# Patient Record
Sex: FEMALE | Race: WHITE | NOT HISPANIC OR LATINO | URBAN - METROPOLITAN AREA
[De-identification: names, ages, dates, MRNs, and addresses within clinical notes are randomized per-mention and may not be internally consistent; named-entity substitution may affect disease eponyms.]

---

## 2017-01-04 ENCOUNTER — AMBULATORY - HEALTHEAST (OUTPATIENT)
Dept: NEUROSURGERY | Facility: CLINIC | Age: 37
End: 2017-01-04

## 2017-01-04 ENCOUNTER — OFFICE VISIT - HEALTHEAST (OUTPATIENT)
Dept: NEUROSURGERY | Facility: CLINIC | Age: 37
End: 2017-01-04

## 2017-01-04 DIAGNOSIS — M45.9 AS (ANKYLOSING SPONDYLITIS) (H): ICD-10-CM

## 2017-01-04 DIAGNOSIS — M54.2 NECK PAIN OF OVER 3 MONTHS DURATION: ICD-10-CM

## 2017-01-04 DIAGNOSIS — M54.12 BRACHIAL NEURITIS OR RADICULITIS: ICD-10-CM

## 2017-01-04 ASSESSMENT — MIFFLIN-ST. JEOR: SCORE: 1375.09

## 2017-01-31 ENCOUNTER — COMMUNICATION - HEALTHEAST (OUTPATIENT)
Dept: FAMILY MEDICINE | Facility: CLINIC | Age: 37
End: 2017-01-31

## 2017-01-31 DIAGNOSIS — F90.0 ATTENTION DEFICIT HYPERACTIVITY DISORDER (ADHD), PREDOMINANTLY INATTENTIVE TYPE: ICD-10-CM

## 2017-03-06 ENCOUNTER — COMMUNICATION - HEALTHEAST (OUTPATIENT)
Dept: FAMILY MEDICINE | Facility: CLINIC | Age: 37
End: 2017-03-06

## 2017-03-06 DIAGNOSIS — F90.0 ATTENTION DEFICIT HYPERACTIVITY DISORDER (ADHD), PREDOMINANTLY INATTENTIVE TYPE: ICD-10-CM

## 2017-04-06 ENCOUNTER — COMMUNICATION - HEALTHEAST (OUTPATIENT)
Dept: FAMILY MEDICINE | Facility: CLINIC | Age: 37
End: 2017-04-06

## 2017-04-06 DIAGNOSIS — F90.0 ATTENTION DEFICIT HYPERACTIVITY DISORDER (ADHD), PREDOMINANTLY INATTENTIVE TYPE: ICD-10-CM

## 2017-05-09 ENCOUNTER — COMMUNICATION - HEALTHEAST (OUTPATIENT)
Dept: FAMILY MEDICINE | Facility: CLINIC | Age: 37
End: 2017-05-09

## 2017-05-09 DIAGNOSIS — F90.0 ATTENTION DEFICIT HYPERACTIVITY DISORDER (ADHD), PREDOMINANTLY INATTENTIVE TYPE: ICD-10-CM

## 2017-06-07 ENCOUNTER — COMMUNICATION - HEALTHEAST (OUTPATIENT)
Dept: FAMILY MEDICINE | Facility: CLINIC | Age: 37
End: 2017-06-07

## 2017-06-07 DIAGNOSIS — F90.0 ATTENTION DEFICIT HYPERACTIVITY DISORDER (ADHD), PREDOMINANTLY INATTENTIVE TYPE: ICD-10-CM

## 2017-07-14 ENCOUNTER — COMMUNICATION - HEALTHEAST (OUTPATIENT)
Dept: FAMILY MEDICINE | Facility: CLINIC | Age: 37
End: 2017-07-14

## 2017-07-14 DIAGNOSIS — F90.0 ATTENTION DEFICIT HYPERACTIVITY DISORDER (ADHD), PREDOMINANTLY INATTENTIVE TYPE: ICD-10-CM

## 2017-08-17 ENCOUNTER — COMMUNICATION - HEALTHEAST (OUTPATIENT)
Dept: FAMILY MEDICINE | Facility: CLINIC | Age: 37
End: 2017-08-17

## 2017-08-17 DIAGNOSIS — F90.0 ATTENTION DEFICIT HYPERACTIVITY DISORDER (ADHD), PREDOMINANTLY INATTENTIVE TYPE: ICD-10-CM

## 2017-08-21 ENCOUNTER — AMBULATORY - HEALTHEAST (OUTPATIENT)
Dept: FAMILY MEDICINE | Facility: CLINIC | Age: 37
End: 2017-08-21

## 2017-08-21 DIAGNOSIS — F90.0 ATTENTION DEFICIT HYPERACTIVITY DISORDER (ADHD), PREDOMINANTLY INATTENTIVE TYPE: ICD-10-CM

## 2017-08-28 ENCOUNTER — OFFICE VISIT - HEALTHEAST (OUTPATIENT)
Dept: INTERNAL MEDICINE | Facility: CLINIC | Age: 37
End: 2017-08-28

## 2017-08-28 DIAGNOSIS — N93.9 ABNORMAL UTERINE BLEEDING (AUB): ICD-10-CM

## 2017-08-28 DIAGNOSIS — R51.9 FACIAL PAIN, ACUTE: ICD-10-CM

## 2017-08-29 ENCOUNTER — AMBULATORY - HEALTHEAST (OUTPATIENT)
Dept: LAB | Facility: CLINIC | Age: 37
End: 2017-08-29

## 2017-08-29 DIAGNOSIS — N93.9 ABNORMAL UTERINE BLEEDING (AUB): ICD-10-CM

## 2017-09-06 ENCOUNTER — COMMUNICATION - HEALTHEAST (OUTPATIENT)
Dept: FAMILY MEDICINE | Facility: CLINIC | Age: 37
End: 2017-09-06

## 2017-09-06 LAB — SPECIMEN STATUS: NORMAL

## 2017-09-22 ENCOUNTER — COMMUNICATION - HEALTHEAST (OUTPATIENT)
Dept: FAMILY MEDICINE | Facility: CLINIC | Age: 37
End: 2017-09-22

## 2017-09-22 DIAGNOSIS — F90.0 ATTENTION DEFICIT HYPERACTIVITY DISORDER (ADHD), PREDOMINANTLY INATTENTIVE TYPE: ICD-10-CM

## 2017-10-27 ENCOUNTER — OFFICE VISIT - HEALTHEAST (OUTPATIENT)
Dept: FAMILY MEDICINE | Facility: CLINIC | Age: 37
End: 2017-10-27

## 2017-10-27 DIAGNOSIS — K13.79 PAIN IN MOUTH: ICD-10-CM

## 2017-11-01 ENCOUNTER — COMMUNICATION - HEALTHEAST (OUTPATIENT)
Dept: FAMILY MEDICINE | Facility: CLINIC | Age: 37
End: 2017-11-01

## 2017-11-01 DIAGNOSIS — F90.0 ATTENTION DEFICIT HYPERACTIVITY DISORDER (ADHD), PREDOMINANTLY INATTENTIVE TYPE: ICD-10-CM

## 2017-12-14 ENCOUNTER — COMMUNICATION - HEALTHEAST (OUTPATIENT)
Dept: FAMILY MEDICINE | Facility: CLINIC | Age: 37
End: 2017-12-14

## 2017-12-14 DIAGNOSIS — F90.0 ATTENTION DEFICIT HYPERACTIVITY DISORDER (ADHD), PREDOMINANTLY INATTENTIVE TYPE: ICD-10-CM

## 2018-01-29 ENCOUNTER — COMMUNICATION - HEALTHEAST (OUTPATIENT)
Dept: FAMILY MEDICINE | Facility: CLINIC | Age: 38
End: 2018-01-29

## 2018-01-29 DIAGNOSIS — F90.0 ATTENTION DEFICIT HYPERACTIVITY DISORDER (ADHD), PREDOMINANTLY INATTENTIVE TYPE: ICD-10-CM

## 2018-02-07 ENCOUNTER — OFFICE VISIT - HEALTHEAST (OUTPATIENT)
Dept: FAMILY MEDICINE | Facility: CLINIC | Age: 38
End: 2018-02-07

## 2018-02-07 DIAGNOSIS — L91.8 SKIN TAG: ICD-10-CM

## 2018-02-07 DIAGNOSIS — F90.0 ATTENTION DEFICIT HYPERACTIVITY DISORDER (ADHD), PREDOMINANTLY INATTENTIVE TYPE: ICD-10-CM

## 2018-02-07 DIAGNOSIS — N93.9 ABNORMAL UTERINE BLEEDING: ICD-10-CM

## 2018-02-07 LAB
ESTRADIOL SERPL-MCNC: 40 PG/ML
FSH SERPL-ACNC: 13.1 MIU/ML
LH SERPL-ACNC: 4.6 MIU/ML
PROLACTIN SERPL-MCNC: 19.4 NG/ML (ref 0–20)

## 2018-02-07 ASSESSMENT — MIFFLIN-ST. JEOR: SCORE: 1388.69

## 2018-02-08 ENCOUNTER — COMMUNICATION - HEALTHEAST (OUTPATIENT)
Dept: FAMILY MEDICINE | Facility: CLINIC | Age: 38
End: 2018-02-08

## 2018-02-21 ENCOUNTER — COMMUNICATION - HEALTHEAST (OUTPATIENT)
Dept: FAMILY MEDICINE | Facility: CLINIC | Age: 38
End: 2018-02-21

## 2018-03-23 ENCOUNTER — COMMUNICATION - HEALTHEAST (OUTPATIENT)
Dept: FAMILY MEDICINE | Facility: CLINIC | Age: 38
End: 2018-03-23

## 2018-03-23 DIAGNOSIS — F90.0 ATTENTION DEFICIT HYPERACTIVITY DISORDER (ADHD), PREDOMINANTLY INATTENTIVE TYPE: ICD-10-CM

## 2018-04-27 ENCOUNTER — COMMUNICATION - HEALTHEAST (OUTPATIENT)
Dept: FAMILY MEDICINE | Facility: CLINIC | Age: 38
End: 2018-04-27

## 2018-04-27 DIAGNOSIS — F90.0 ATTENTION DEFICIT HYPERACTIVITY DISORDER (ADHD), PREDOMINANTLY INATTENTIVE TYPE: ICD-10-CM

## 2018-06-14 ENCOUNTER — COMMUNICATION - HEALTHEAST (OUTPATIENT)
Dept: FAMILY MEDICINE | Facility: CLINIC | Age: 38
End: 2018-06-14

## 2018-06-14 DIAGNOSIS — F90.0 ATTENTION DEFICIT HYPERACTIVITY DISORDER (ADHD), PREDOMINANTLY INATTENTIVE TYPE: ICD-10-CM

## 2018-07-23 ENCOUNTER — COMMUNICATION - HEALTHEAST (OUTPATIENT)
Dept: FAMILY MEDICINE | Facility: CLINIC | Age: 38
End: 2018-07-23

## 2018-07-23 DIAGNOSIS — F90.0 ATTENTION DEFICIT HYPERACTIVITY DISORDER (ADHD), PREDOMINANTLY INATTENTIVE TYPE: ICD-10-CM

## 2018-09-06 ENCOUNTER — COMMUNICATION - HEALTHEAST (OUTPATIENT)
Dept: FAMILY MEDICINE | Facility: CLINIC | Age: 38
End: 2018-09-06

## 2018-09-06 DIAGNOSIS — F90.0 ATTENTION DEFICIT HYPERACTIVITY DISORDER (ADHD), PREDOMINANTLY INATTENTIVE TYPE: ICD-10-CM

## 2018-10-11 ENCOUNTER — COMMUNICATION - HEALTHEAST (OUTPATIENT)
Dept: FAMILY MEDICINE | Facility: CLINIC | Age: 38
End: 2018-10-11

## 2018-10-11 DIAGNOSIS — F90.0 ATTENTION DEFICIT HYPERACTIVITY DISORDER (ADHD), PREDOMINANTLY INATTENTIVE TYPE: ICD-10-CM

## 2018-12-02 ENCOUNTER — COMMUNICATION - HEALTHEAST (OUTPATIENT)
Dept: FAMILY MEDICINE | Facility: CLINIC | Age: 38
End: 2018-12-02

## 2018-12-02 DIAGNOSIS — F90.0 ATTENTION DEFICIT HYPERACTIVITY DISORDER (ADHD), PREDOMINANTLY INATTENTIVE TYPE: ICD-10-CM

## 2019-01-24 ENCOUNTER — COMMUNICATION - HEALTHEAST (OUTPATIENT)
Dept: FAMILY MEDICINE | Facility: CLINIC | Age: 39
End: 2019-01-24

## 2019-01-24 DIAGNOSIS — F90.0 ATTENTION DEFICIT HYPERACTIVITY DISORDER (ADHD), PREDOMINANTLY INATTENTIVE TYPE: ICD-10-CM

## 2019-04-02 ENCOUNTER — COMMUNICATION - HEALTHEAST (OUTPATIENT)
Dept: FAMILY MEDICINE | Facility: CLINIC | Age: 39
End: 2019-04-02

## 2019-04-02 DIAGNOSIS — F90.0 ATTENTION DEFICIT HYPERACTIVITY DISORDER (ADHD), PREDOMINANTLY INATTENTIVE TYPE: ICD-10-CM

## 2019-05-29 ENCOUNTER — COMMUNICATION - HEALTHEAST (OUTPATIENT)
Dept: FAMILY MEDICINE | Facility: CLINIC | Age: 39
End: 2019-05-29

## 2019-05-29 DIAGNOSIS — M54.12 BRACHIAL NEURITIS OR RADICULITIS: ICD-10-CM

## 2019-05-29 DIAGNOSIS — F90.0 ATTENTION DEFICIT HYPERACTIVITY DISORDER (ADHD), PREDOMINANTLY INATTENTIVE TYPE: ICD-10-CM

## 2019-05-29 RX ORDER — DEXTROAMPHETAMINE SACCHARATE, AMPHETAMINE ASPARTATE MONOHYDRATE, DEXTROAMPHETAMINE SULFATE AND AMPHETAMINE SULFATE 5; 5; 5; 5 MG/1; MG/1; MG/1; MG/1
20 CAPSULE, EXTENDED RELEASE ORAL EVERY MORNING
Qty: 30 CAPSULE | Refills: 0 | Status: SHIPPED | OUTPATIENT
Start: 2019-05-29

## 2021-05-27 NOTE — TELEPHONE ENCOUNTER
Controlled Substance Refill Request  Medication Name:   Requested Prescriptions     Pending Prescriptions Disp Refills     dextroamphetamine-amphetamine (ADDERALL XR) 20 MG 24 hr capsule 30 capsule 0     Sig: Take 1 capsule (20 mg total) by mouth every morning.     Date Last Fill: 1/24/2019Pharmacy: [facility]      Submit electronically to pharmacy  Controlled Substance Agreement Date Scanned:   Encounter-Level CSA Scan Date - 02/14/2018:    Scan on 2/14/2018: HE - (below)         Last office visit with prescriber/PCP: 2/7/2018 Hever Keller MD OR same dept: Visit date not found OR same specialty: 2/7/2018 Hever Keller MD  Last physical: Visit date not found Last MTM visit: Visit date not found

## 2021-05-29 NOTE — TELEPHONE ENCOUNTER
Who is calling:  The patient   Reason for Call:  The patient is calling to check the status of the medication request. The patient is aware that the script is pending review.   Date of last appointment with primary care: 2/7/2018  Okay to leave a detailed message: No

## 2021-05-30 VITALS — BODY MASS INDEX: 23.54 KG/M2 | WEIGHT: 150 LBS | HEIGHT: 67 IN

## 2021-05-31 VITALS — BODY MASS INDEX: 23.85 KG/M2 | WEIGHT: 150 LBS

## 2021-05-31 VITALS — WEIGHT: 157.8 LBS | BODY MASS INDEX: 25.09 KG/M2

## 2021-06-01 VITALS — WEIGHT: 153 LBS | HEIGHT: 67 IN | BODY MASS INDEX: 24.01 KG/M2

## 2021-06-03 ENCOUNTER — RECORDS - HEALTHEAST (OUTPATIENT)
Dept: ADMINISTRATIVE | Facility: CLINIC | Age: 41
End: 2021-06-03

## 2021-06-08 NOTE — PROGRESS NOTES
Assessment/Plan:        Diagnoses and all orders for this visit:    Cervical Radiculopathy  -     OPS TFESI C/T Spine Unilateral; Future; Expected date: 1/4/17    AS (ankylosing spondylitis)    Neck pain of over 3 months duration  -     Ambulatory referral to Physical Therapy  -     Ambulatory referral for Acupuncture            It was a pleasure to evaluate Mis Salgado at the kind referral of Liya Joseph PA-C for neck and left arm pain.    Prior to making any surgical recommendations, this patient needs to obtain the x-rays that I had ordered prior to her visit which she did not obtain yet.  She is to return following these x-rays for discussion of the results.    I talked to her that foraminotomy is the most likely surgery that I would recommend due to her isolated C4-C5 left foraminal stenosis if left upper arm pain symptoms are refractory to injections.  She states that her left arm numbness resolved after her injection in December 2016.  She further stated that she needed to injections 2 years ago in order to see benefit for her arm pain.  I told her I would be happy to order another left C4-C5 injection for her if her symptoms came back if she wanted to try that again.  I told her that if her left arm symptoms were the worst thing, then she would likely be a candidate for surgery.  I did carefully differentiate between the baseline neck pain that she has and her left C5 radiculopathy.  I told her that her left finger tingling has nothing to do with left C5 and we do not have an EMG diagnosis for that and therefore I would not expect it to improve with any surgery.  She states her left finger tingling is minor and is brought on by things like typing at work and driving and otherwise is not distressing to her.  She is going to think more about whether her left upper arm pain is significantly bothering her.  If it is, I recommend that she obtain the x-rays that I had ordered an return for surgical  "discussion.  I emphasized on several different occasions that her neck pain due to her spondyloarthropathy would likely not be improved by any foraminotomy    I think she would benefit from ongoing conservative treatment and I ordered cervical spine physical therapy for her.  Acupuncture would likely be highly useful for her for the musculoskeletal component of her pain and I have ordered this as well.    This patient's ankylosing spondylitis treatment with Humira would complicate her surgical course. She has significant SI joint and neck pain that are due to AS and not going to be improved with surgery. There is ample literature based evidence the patient's on a TNF inhibitor such as Humira that this patient is on, exhibit a 2.5 fold increased incidence (or higher) of surgical complications with wound healing and infection.  Recommendations are to hold this medication for greater than one administration interval prior to and following surgery to assist in healing.  However, the patient remains in an elevated risk of infection and non- healing.      I performed independent visualization of radiographic imaging and entered my own interpretation, \"reviewed and/or ordered clinical laboratory tests, reviewed and/or ordered tests in radiology, made the decision to obtain old records and/or history from someone other than the patient and Reviewed and summarized old records and/or discussed this case with another health care provider\".      I spent 60 minutes in patient care with greater than 50% spent in counseling and/or coordination of care.      Subjective:    Patient ID: Mis Salgado is a 36 y.o. female.    HPI    Mis Salgado is a 36 y.o. female who presents with left-sided neck pain with radiation into the left upper extremity in shoulder and upper arm, not extending distal to elbow. Exacerbated by driving, typing, and laying down to sleep. Relieved with lifting her left arm above her head. Symptoms " started several years ago when a mattress fell on her.  She had relief of arm pain at that time with 2 MARIANELA    I obtained and reviewed EMG by Dr. Vu done 12/16 which did not show any abnormalities; no carpal tunnel or radiculopathy.    I have reviewed Liya Jake's prior notes from 12/16- patient is status post a left C4-5 transforaminal epidural steroid injection on December 5, 2016; patient stated this provided 70% relief of her daytime pain, and her left arm numbness from shoulder to elbow is gone. The patient did physical therapy for her neck in 2014. She is using cyclobenzaprine at 3-4 times per week. She is also using ibuprofen as needed for pain.    She also has numbness and tingling in the left thumb and 1st finger, this has been present and not affected by C4-5 TFESI, it is exacerbated by having ponytail holders on her wrist, and was relieved somewhat with taking these off.        She has ankylosing spondylitis and is on Humira injections. She sees Dr. Madrid of Rheumatology but her insurance recently changed and she said she needs to find a new Rheumatologist.    She has been treated for SI joint pain from her AS.        IMAGING per my interpretation:  MRI cervical spine 11/30/16: left C4-5 foraminal stenosis, no significant central stenosis, no foraminal stenosis at left C5-6    XRAYS: xrays were ordered by me prior to patient's visit and my office made several phone calls to assist her in scheduling him.  Patient did not obtain x-rays prior to this visit        Review of Systems   Constitutional: Negative for activity change, appetite change, chills, fatigue, fever and unexpected weight change.   HENT: Negative for dental problem, mouth sores and trouble swallowing.    Eyes: Negative for visual disturbance.   Respiratory: Negative for shortness of breath.    Cardiovascular: Negative for leg swelling.   Gastrointestinal: Negative for abdominal pain, constipation, diarrhea, nausea and vomiting.    Endocrine: Negative for cold intolerance.   Genitourinary: Negative for difficulty urinating, dysuria, enuresis, frequency and urgency.   Musculoskeletal: Positive for myalgias, neck pain and neck stiffness. Negative for back pain and gait problem.   Skin: Negative for color change.   Allergic/Immunologic: Negative for immunocompromised state.   Neurological: Positive for weakness and numbness. Negative for tremors, speech difficulty and headaches.   Hematological: Does not bruise/bleed easily.   Psychiatric/Behavioral: The patient is not nervous/anxious.      Past Medical History  Patient Active Problem List   Diagnosis     Cervicalgia     Limb Pain     Upper Respiratory Infection     Fever (Symptom)     Nausea With Vomiting     ADHD, Predominantly Inattentive Type     Cervical Radiculopathy     AS (ankylosing spondylitis)     Past Surgical History   Procedure Laterality Date     Pr removal of tonsils,<14 y/o       Description: Tonsillectomy;  Recorded: 03/12/2014;     Social History     Social History     Marital status:      Spouse name: N/A     Number of children: N/A     Years of education: N/A     Occupational History     Not on file.     Social History Main Topics     Smoking status: Former Smoker     Smokeless tobacco: Not on file      Comment: E-cig prn and quit cigarettes in fall of 2014     Alcohol use Yes      Comment: Wine on occasion      Drug use: No     Sexual activity: Not on file     Other Topics Concern     Not on file     Social History Narrative     Family HIstory- sister has scoliosis; history of AS and two brothers with cervical spine surgery for trauma            Objective:    Physical Exam   Constitutional: She is oriented to person, place, and time. She appears well-developed and well-nourished. She is cooperative. No distress.   HENT:   Head: Normocephalic and atraumatic.   Eyes: Conjunctivae are normal.   Neck: Normal range of motion. Neck supple. No spinous process tenderness  and no muscular tenderness present. No tracheal deviation present.   Cardiovascular: Normal rate and regular rhythm.    Pulmonary/Chest: Effort normal and breath sounds normal.   Abdominal: Soft. Bowel sounds are normal. She exhibits no distension. There is no tenderness.   Musculoskeletal:   Cervical flexion/extension ROM: limited due to pain  Lumbar flexion/extension ROM: normal   Neurological: She is alert and oriented to person, place, and time. No cranial nerve deficit or sensory deficit. She displays a negative Romberg sign. Gait normal. She displays no Babinski's sign on the right side. She displays no Babinski's sign on the left side.   Reflex Scores:       Bicep reflexes are 2+ on the right side and 2+ on the left side.       Brachioradialis reflexes are 3+ on the right side and 3+ on the left side.       Patellar reflexes are 3+ on the right side and 3+ on the left side.  Strength:                                                LEFT      RIGHT  Deltoid                                     5            5  Bicep                                       5            5  Wrist Extensor                        5            5   Tricep                                      5            5  Finger Flexion                         5             5  Finger Abduction                     5            5                                            5           5    Hip Flexion                               5            5   Knee Extension                       5             5  Dorsiflexion                              5             5  Extensor Hallucis Longus        5            5  Plantar Flexion                         5             5    No Lhermitte's, No Spurling's but patient guards with left head turn  No Yu's   No ankle clonus  Able to tandem walk without difficulty         Skin: Skin is warm, dry and intact.   Psychiatric: She has a normal mood and affect. Her speech is normal and behavior is normal.

## 2021-06-08 NOTE — PROGRESS NOTES
Neurosurgery consultation was requested by:  {Liya Joseph PA-C    Pain location: neck      Radicular Pain is present: travels down left arm to elbow     Lhermitte sign: denies       Motor complaints:     Weakness in left arm    Sensory complaints: tingling and numbness in left index and middle finger on left side       Gait and balance issues: denies     Bowel or bladder issues: denies        Duration of SX is: 2 years ago    The symptoms are worse with: sleeping, sitting when arm not supported, driving     The symptoms are better with: resting, warm water soak in bath tub    Injury: denies      Severity is: moderate       Patient has tried the following conservative measures: MARIANELA injections- helpful but not this last time,  PT- not helpful         NDI score is :      44 %           Monserrat, CMA

## 2021-06-12 NOTE — PROGRESS NOTES
[facility] Clinic Note  Patient Name: Mis Salgado  Patient Age: 37 y.o.  YOB: 1980  MRN: [id number]  ?  Date of Visit: 8/28/2017  Reason for Office Visit:   Chief Complaint   Patient presents with     Menstrual Problem     gets period every 8-10 days     Dental Pain     nerve pain around left side of jaw, traveling up to eye socket. very tender x1 week, cant sleep       HPI: Mis Salgado 37 y.o. female with a history of AS, who presents to clinic for two concerns today    1. Menstrual irregularities - occurring every 8 - 10 days. Has always had irregularities, had IUD in the past. Cramps, heavy menstrual flow, pads lasting. No family history of bleeding disorders. Bruises easily. She does get bleeding gums easily. Humira started 2 years ago, off it for 3 months at some point. Has been consistent back on it since March 2. Left side jaw pain. She had her teeth cleaned 2 weeks ago. Then developed shooting pain down left jaw and radiating to eye socket. She immediately went back to her dentist. Pain comes and goes. Her  has tried to localize the pain and 'feels a palpable lump in lower left jaw'. She had some hydrocodone left over and took       Review of Systems: As noted in HPI     Current Scheduled Meds:  Outpatient Encounter Prescriptions as of 8/28/2017   Medication Sig Dispense Refill     adalimumab (HUMIRA) 40 mg/0.8 mL injection Inject 40 mg under the skin once.       dextroamphetamine-amphetamine (ADDERALL XR) 20 MG 24 hr capsule Take 1 capsule (20 mg total) by mouth every morning. 30 capsule 0     HYDROcodone-acetaminophen 5-325 mg per tablet Take 1 tablet by mouth 2 (two) times a day as needed for pain. 10 tablet 0     ibuprofen (ADVIL,MOTRIN) 200 MG tablet Take 200 mg by mouth every 6 (six) hours as needed for pain.       cyclobenzaprine (FLEXERIL) 10 MG tablet Take 0.5-1 tablets (5-10 mg total) by mouth bedtime as needed for muscle spasms. 10 tablet 0      dextroamphetamine-amphetamine (ADDERALL XR) 20 MG 24 hr capsule Take 1 capsule (20 mg total) by mouth every morning. 30 capsule 0     No facility-administered encounter medications on file as of 8/28/2017.        Objective / Physical Examination:  /76  Pulse 88  Wt 150 lb (68 kg)  BMI 23.85 kg/m2  Wt Readings from Last 3 Encounters:   08/28/17 150 lb (68 kg)   01/04/17 150 lb (68 kg)   12/23/16 150 lb (68 kg)     Body mass index is 23.85 kg/(m^2). (>25?)    General Appearance: Alert and oriented in no acute distress  Head: left sided palpable lump around sublingual gland,roughly 1 cm, soft, tender  Lymph: no enlargement of cervical or auricular nodes  Ears: Tympanic membrane clear with landmarks well visualized bilaterally  Eyes. Conjunctivae clear  Nose: Septum midline, nares patent, no visible polyps, mucosa moist and without drainage  Throat: Teeth in good repair, pharynx without erythema or exudate  Neck: Supple, trachea midline. No cervical adenopathy. No Thyromegaly   Lungs: Clear to auscultation bilaterally. Normal inspiratory and expiratory effort. No w/r/r  Cardiovascular: RRR.   Integumentary: Warm and dry. Without suspicious looking lesions  Neuro: Alert and oriented, follows commands appropriately    Assessment / Plan / Medical Decision Making:      Encounter Diagnoses   Name Primary?     Abnormal uterine bleeding (AUB) Yes     Facial pain, acute         1. Abnormal uterine bleeding (AUB)  Most likely anovulatory  Has been on IUD in the past to help regulate it  Could it be due to humira? Not sure   With history of easy bruising and bleeding gums, will check a platelet function and VWF  Cbc normal today  Will check TSH   - Platelet Function Test  - Thyroid Stimulating Hormone (TSH)  - HM2(CBC w/o Differential)  - Von Willebrand Factor Activity (VWF:Act); Future    2. Facial pain, acute  Left sided salivary gland swelling, sialolith? Unlikely infectious given lack of constitutional s/s   Will  refer her to an ENT to evaluate further. Decided not to empirically treat with antibiotics given lack of other symptoms to suggest bacterial infection. No sign of abscess  TMJ also a possibility   - Ambulatory referral to ENT    Follow up labs and if normal, can discuss starting back on birth control which may help regulate     Total time spent with patient was 25 minutes with >50% of time spent in face-to-face counseling regarding the above plan     Carson Cuenca MD  [facility]

## 2021-06-13 NOTE — PROGRESS NOTES
"Chief Complaint   Patient presents with     Facial Swelling     chin area, hard to talk and swallow x 1 day        History of Present Illness: Nursing notes reviewed.   Patient reports pain in her left chin.  She also noted some left-sided swelling.  She was seen by her primary care doctor who thought she had blocked salivary gland.  She was given an ENT appointment however she canceled it because she thought she was feeling better.  She returns today complaining pain in the left shin with some swelling.  And around the submental region.  She states \"it is painful to swallow\".  She denies any associated fever or chills.  She denies any injuries to the jaw.  Review of systems: See history of present illness, otherwise negative.     Current Outpatient Prescriptions   Medication Sig Dispense Refill     adalimumab (HUMIRA) 40 mg/0.8 mL injection Inject 40 mg under the skin once.       dextroamphetamine-amphetamine (ADDERALL XR) 20 MG 24 hr capsule Take 1 capsule (20 mg total) by mouth every morning. 30 capsule 0     HYDROcodone-acetaminophen 5-325 mg per tablet Take 1 tablet by mouth 2 (two) times a day as needed for pain. 10 tablet 0     ibuprofen (ADVIL,MOTRIN) 200 MG tablet Take 200 mg by mouth every 6 (six) hours as needed for pain.       cyclobenzaprine (FLEXERIL) 10 MG tablet Take 0.5-1 tablets (5-10 mg total) by mouth bedtime as needed for muscle spasms. 10 tablet 0     dextroamphetamine-amphetamine (ADDERALL XR) 20 MG 24 hr capsule Take 1 capsule (20 mg total) by mouth every morning. 30 capsule 0     No current facility-administered medications for this visit.        No past medical history on file.   Past Surgical History:   Procedure Laterality Date     VA REMOVAL OF TONSILS,<14 Y/O      Description: Tonsillectomy;  Recorded: 03/12/2014;      Social History     Social History     Marital status:      Spouse name: N/A     Number of children: N/A     Years of education: N/A     Social History Main Topics "     Smoking status: Former Smoker     Smokeless tobacco: None      Comment: E-cig prn and quit cigarettes in fall of 2014     Alcohol use Yes      Comment: Wine on occasion      Drug use: No     Sexual activity: Not Asked     Other Topics Concern     None     Social History Narrative       History   Smoking Status     Former Smoker   Smokeless Tobacco     Not on file     Comment: E-cig prn and quit cigarettes in fall of 2014      Exam:   Blood pressure 122/82, pulse 93, temperature 98.3  F (36.8  C), temperature source Oral, resp. rate 16, weight 157 lb 12.8 oz (71.6 kg), SpO2 100 %, not currently breastfeeding.    EXAM:   General: Well-appearing well grown adult female in no acute distress.  Head eyes ears nose and throat head is normocephalic atraumatic eyes pupils appear equal round and reactive to light conjunctiva is moist and pink sclera anicteric.  Mouth there is tenderness on palpation around submental region and there is some swelling noted on her left chin tender to touch.  No erythema noted no fluctuance noted.  Oropharynx is clear.  Lungs she has a normal respiratory respiratory effort and auscultation breath sounds are clear.  Heart she has a good S1 and S2 peripheral pulses are palpable.  Skin on inspection no rashes noted she is warm to touch and skin turgor appear normal    No results found for this or any previous visit (from the past 24 hour(s)).    Assessment/Plan   1. Pain in mouth     Tylenol or ibuprofen as needed for pain and fever.  Return to clinic or go to the nearest emergency room or hospital in case of any acute changes.  There are no Patient Instructions on file for this visit.   Rick Pascual MD

## 2021-06-15 PROBLEM — M54.2 NECK PAIN OF OVER 3 MONTHS DURATION: Status: ACTIVE | Noted: 2017-01-04

## 2021-06-15 NOTE — PROGRESS NOTES
Subjective:   Mis comes in because of menstrual irregularities. Over the last few months she's had prolonged menses. She said increased flow as well. She had a workup for bleeding disorders which was negative. Energy is been fair. She also was here for refill her Adderall. She needs a controlled substance agreement signed. She feels the Adderall works well for her to concentrate. She does not take this every day. She uses this mostly for work so she is more productive. She's also had a growth on her back on the left side she wants evaluated. It really is not bother her at all. She's not sure it's really changed much recently.      Objective:  HEENT: within normal limits  neck: no thyromegaly noted.  Cardiac: there is a regular rhythm present. No act be heard.  Lungs: patient is in no respiratory distress.  Abdomen: abdomen is soft and nontender today.  Musculoskeletal: no edema noted in the feet today. Deep tendon reflexes symmetric in the production of his.  Dermatologic: there is a pigmented skin tag noted over the left lateral thoracic area in the back. This is very benign in appearance. It's well-defined. It's not irritated or inflamed at all. It is uniform in color.      Assessment:  1.  Abnormal uterine bleeding  2.  Benign skin tag  3.  ADHD      Plan:  Patient will continue Adderall. Controlled substance agreement signed today. Will check in estradiol, LH, FSH, and prolactin level today. She will be called with any abnormalities. Thyroid was recently checked and was normal. Just observe the skin tag on the back is it is not bothering the patient at all. Follow up here as needed. If bleeding persists in lab work up totally negative she will benefit from a pelvic ultrasound.

## 2021-06-23 NOTE — TELEPHONE ENCOUNTER
Controlled Substance Refill Request  Medication Name:   Requested Prescriptions     Pending Prescriptions Disp Refills     dextroamphetamine-amphetamine (ADDERALL XR) 20 MG 24 hr capsule 30 capsule 0     Sig: Take 1 capsule (20 mg total) by mouth every morning.     Date Last Fill: 12/04/2018  [id number]      Submit electronically to pharmacy  Controlled Substance Agreement Date Scanned:   Encounter-Level CSA Scan Date - 02/14/2018:    Scan on 2/14/2018: HE - (below)         Last office visit with prescriber/PCP: 2/7/2018 Hever Keller MD OR same dept: 2/7/2018 Hever Keller MD OR same specialty: 2/7/2018 Hever Keller MD  Last physical: Visit date not found Last MTM visit: Visit date not found